# Patient Record
Sex: MALE | Race: WHITE | HISPANIC OR LATINO | Employment: UNEMPLOYED | URBAN - METROPOLITAN AREA
[De-identification: names, ages, dates, MRNs, and addresses within clinical notes are randomized per-mention and may not be internally consistent; named-entity substitution may affect disease eponyms.]

---

## 2021-11-23 ENCOUNTER — HOSPITAL ENCOUNTER (EMERGENCY)
Facility: HOSPITAL | Age: 26
Discharge: HOME | End: 2021-11-23
Attending: EMERGENCY MEDICINE | Admitting: EMERGENCY MEDICINE

## 2021-11-23 VITALS
OXYGEN SATURATION: 99 % | TEMPERATURE: 98.6 F | RESPIRATION RATE: 20 BRPM | DIASTOLIC BLOOD PRESSURE: 101 MMHG | HEART RATE: 88 BPM | WEIGHT: 150 LBS | HEIGHT: 65 IN | BODY MASS INDEX: 24.99 KG/M2 | SYSTOLIC BLOOD PRESSURE: 164 MMHG

## 2021-11-23 DIAGNOSIS — U07.1 COVID-19 VIRUS INFECTION: Primary | ICD-10-CM

## 2021-11-23 LAB
FLUAV RNA RESP QL NAA+PROBE: NOT DETECTED
FLUBV RNA RESP QL NAA+PROBE: NOT DETECTED
SARS-COV-2 RNA RESP QL NAA+PROBE: DETECTED

## 2021-11-23 PROCEDURE — 87636 SARSCOV2 & INF A&B AMP PRB: CPT | Performed by: EMERGENCY MEDICINE

## 2021-11-23 PROCEDURE — 99283 EMERGENCY DEPT VISIT LOW MDM: CPT

## 2021-11-23 PROCEDURE — 99282 EMERGENCY DEPT VISIT SF MDM: CPT | Performed by: EMERGENCY MEDICINE

## 2021-11-23 NOTE — ED PROVIDER NOTES
EMERGENCY DEPARTMENT ENCOUNTER      Room Number: HALA/HA      HPI:    Chief complaint: Fever and body aches    Location: Generalized    Quality/Severity: Moderate    Timing/Duration: Symptoms started 3 days ago    Modifying Factors: 2 other members of household recently diagnosed with COVID-19    Associated Symptoms: Mild headache    Narrative: Pt is a 26 y.o. male who presents complaining of headache, fever and body aches as noted above.  Patient resides with 2 other individuals recently diagnosed with COVID-19.  Patient began to have symptoms 3 days ago.  Patient has not been vaccinated and the fever has been subjective.  No significant cough or shortness of breath      PMD: Provider, No Known    REVIEW OF SYSTEMS  Review of Systems   Constitutional: Positive for activity change, appetite change, fatigue and fever (Subjective).   HENT: Negative for congestion, rhinorrhea and sore throat.    Eyes: Negative for discharge and redness.   Respiratory: Negative for cough and shortness of breath.    Cardiovascular: Negative for chest pain.   Skin: Negative for rash.   All other systems reviewed and are negative.      PAST MEDICAL HISTORY  Active Ambulatory Problems     Diagnosis Date Noted   • No Active Ambulatory Problems     Resolved Ambulatory Problems     Diagnosis Date Noted   • No Resolved Ambulatory Problems     No Additional Past Medical History       PAST SURGICAL HISTORY  No past surgical history on file.    FAMILY HISTORY  No family history on file.    SOCIAL HISTORY  Social History     Socioeconomic History   • Marital status: Single       ALLERGIES  Patient has no allergy information on record.    PHYSICAL EXAM  ED Triage Vitals [11/23/21 1440]   Temp Heart Rate Resp BP SpO2   98.6 °F (37 °C) 88 20 (!) 164/101 99 %      Temp src Heart Rate Source Patient Position BP Location FiO2 (%)   Oral Monitor Sitting Right arm --       Physical Exam  Vitals and nursing note reviewed.   Constitutional:       Comments:  The patient is a healthy-appearing, 26-year-old, male in no acute distress.   Eyes:      Conjunctiva/sclera: Conjunctivae normal.   Cardiovascular:      Rate and Rhythm: Normal rate and regular rhythm.      Heart sounds: Normal heart sounds. No murmur heard.      Pulmonary:      Effort: Pulmonary effort is normal. No respiratory distress.      Breath sounds: Normal breath sounds. No wheezing.   Musculoskeletal:      Cervical back: Normal range of motion and neck supple.   Skin:     General: Skin is warm and dry.   Neurological:      General: No focal deficit present.      Mental Status: He is oriented to person, place, and time.   Psychiatric:         Mood and Affect: Mood normal.         Behavior: Behavior normal.         LAB RESULTS  Results for orders placed or performed during the hospital encounter of 11/23/21   COVID-19 and FLU A/B PCR - Swab, Nasopharynx    Specimen: Nasopharynx; Swab   Result Value Ref Range    COVID19 Detected (C) Not Detected - Ref. Range    Influenza A PCR Not Detected Not Detected    Influenza B PCR Not Detected Not Detected         I ordered the above labs and reviewed the results    RADIOLOGY  No results found.    I ordered the above radiologic testing and reviewed the results    PROCEDURES  Procedures      PROGRESS AND CONSULTS  ED Course as of 11/23/21 1609   Tue Nov 23, 2021   1609 Full PPE attire was worn during all face-to-face contact with this patient.  Diagnosis of COVID-19 infection discussed with patient along with treatment plan, expectations and warnings.  Patient was specifically and emphatically advised to quarantine for the next 10 days. [ML]      ED Course User Index  [ML] Gabriel Colunga MD           MEDICAL DECISION MAKING  Results were reviewed/discussed with the patient and they were also made aware of online access. Pt also made aware that some labs, such as cultures, will not be resulted during ER visit and follow up with PMD is necessary.     AMANDA        DIAGNOSIS  Final diagnoses:   COVID-19 virus infection       Latest Documented Vital Signs:  As of 16:09 EST  BP- (!) 164/101 HR- 88 Temp- 98.6 °F (37 °C) (Oral) O2 sat- 99%    DISPOSITION  Discharged in good condition       Medication List      No changes were made to your prescriptions during this visit.          Follow-up Information     [facility].    Why: If symptoms worsen  Contact information:  [address]  [place] [address] [phone]                          Gabriel Colunga MD  11/23/21 5062